# Patient Record
(demographics unavailable — no encounter records)

---

## 2024-10-30 NOTE — DISCUSSION/SUMMARY
[de-identified] : Today I had a lengthy discussion with the patient regarding their left great toe pain. I have addressed all the patient's concerns surrounding the pathology of their condition. I have reviewed the patient's MRI results with them in great detail. Nondisplaced obliquely oriented fracture of the distal first phalangeal head with intra-articular extension. Small capsular avulsion of the medial plantar base of the distal first phalanx. I would like the patient to proceed with conservative management, which was discussed in great detail.     Plan:  1.  In order to provide the patient with a better understanding of their ailment, I educated them about the anatomy, physiology and lifespan of their condition using a foot model. 2. I recommend that the patient utilize ice, NSAIDS/Tylenol PRN, and heat. They can also elevate their RLE above the level of the heart. 3. I recommend that the patient utilize a Marie extension plate when she plays sports. The patient was provided with the Marie extension plate in the office today.  Patient is cleared for all physical activities   f/u if needed.   The patient understood and verbally agreed to the treatment plan. All of their questions were answered, and they were satisfied with the visit. The patient should call the office if they have any questions or experience worsening symptoms.

## 2024-10-30 NOTE — HISTORY OF PRESENT ILLNESS
[FreeTextEntry1] :  10/30/2024: HEIKE BOGGS is a 14 year old female presenting to the office for a follow up evaluation of fracture of the distal first phalangeal head. She reports that her symptoms have improved since her last visit to the office. She is scared to start training again.  The patient presents to the office in snkers and ambulating without assistance.  /04/2024: HEIKE BOGGS is a 14 year old female presenting to the office with her mother for a follow up evaluation and MRI review of her left foot, fracture of the distal first phalangeal head. She reports no change in her symptoms since last visit. However, she denies worsening symptoms. She endorses tenderness to palpation. She denies pain when walking or jogging, but she has yet to attempt sprinting. She has been avoiding sports since last visit. She denies any new trauma or injury. The patient presents to the office in [facility] snkers and ambulating without assistance.  09/25/2024: The patient is a 14-year-old female who presents with great toe pain. The patient is accompanied by his mother. The patient states that she has been experiencing symptoms since 8/12/2024. She was initially told that she sustained a great toe fracture, which was treated conservatively and placed in a hard sole medical shoe. She returned to sports 2-3 weeks later. She recently returned back to school related soccer where she started playing for longer periods of time. Her  was taping her, but she continued to feel pain. She has not been playing soccer for the past week. She presents today wearing sneakers and is ambulating without assistance. No other complaints.

## 2024-10-30 NOTE — PHYSICAL EXAM
[de-identified] : Left Foot Physical Examination:  General: Alert and oriented x3.  In no acute distress.  Pleasant in nature with a normal affect.  No apparent respiratory distress.  Erythema, Warmth, Rubor: Negative Swelling: Negative  Left Big Toe Exam ROM Great toe: 40 degrees of dorsiflexion, 60 degrees of plantarflexion.  ROM Ankle: 1. Dorsiflexion: 10 degrees 2. Plantarflexion: 40 degrees 3. Inversion: 30 degrees 4. Eversion: 20 degrees  ROM of digits: Normal  Pes Planus: Negative Pes Cavus: Negative  Bunion: Negative Tailor's Bunion (Bunionette): Negative Hammer Toe Deformity/Deformities: Negative  Tenderness to Palpation:  1. Heel Pain: Negative 2. Midfoot Pain: Negative 3. First MTP Joint: Negative 4. Lis Franc Joint: Negative 5. Great toe pain at the 1st IP joint  Tenderness Metatarsals: 1st MT: Negative 2nd MT: Negative 3rd MT: Negative 4th MT: Negative 5th MT: Negative Base of the 5th MT: Negative  Ligament Pain: 1. Lis Franc Ligament: Negative 2. Plantar Fascia Ligament: Negative  Strength:  5/5 TA/GS/EHL/FHL/EDL/ADD/ABD  Pulses: 2+ DP/PT Pulses  Capillary Refill Toes: <2 seconds  Neuro: Intact motor and sensory throughout  Additional Test: 1. Marie's Squeeze Test: Negative 2. Calcaneal Squeeze Test: Negative [de-identified] : EXAM: [id number] - MR FOOT LT  - ORDERED BY:  EULALIA HOLMAN   PROCEDURE DATE:  09/26/2024    INTERPRETATION:  CLINICAL INDICATION: Foot pain. Evaluate great toe interphalangeal joint chronic fracture.  MR of the left foot without contrast  COMPARISON: Foot radiograph 9/25/2024 were reviewed.  FINDINGS:  OSSEOUS STRUCTURES: Sequela of oblique fracture of the distal first phalanx extending from the lateral neck to the distal medial cortex with intra-articular extension. Additionally, there is a focal cortical avulsion fracture of the plantar/medial aspect of the base of the distal first phalanx favored to be at the capsular attachment. Patchy marrow edema is present within the head of the proximal first phalanx as well as within the base of the distal phalanx, with slightly more increased marrow signal than expected at the physis, too may represent sequela of a mild type I Salter-Navarro injury.  JOINTS: The articular cartilage is preserved.  TENDONS AND MUSCLES: The tendons and muscles are intact.  LISFRANC AND MIDFOOT LIGAMENTS: The Lisfranc and midfoot ligaments are intact.  PLANTAR PLATES AND FOREFOOT CAPSULAR STRUCTURES: The plantar plates are intact.  WEBSPACES: There is no Marie?s neuroma. Mild first and third webspace bursitis..  SUBCUTANEOUS TISSUES:   Normal.  IMPRESSION: Nondisplaced obliquely oriented fracture of the distal first phalangeal head with intra-articular extension. Small capsular avulsion of the medial plantar base of the distal first phalanx. Slight more hyperintense marrow signal that expected at the physis of the distal first phalanx, which may reflect sequela of a type I Salter-Navarro injury.  --- End of Report ---       RANJANA ANN MD; Attending Radiologist This document has been electronically signed. Sep 27 2024  8:18AM

## 2024-10-30 NOTE — ADDENDUM
[FreeTextEntry1] : I, Agustín Peoples, acted solely as a scribe for Dr. Agustín Caban on this date 10/30/2024  .   All medical record entries made by the Scribe were at my, Dr. Agustín Caban, direction and personally dictated by me on 10/30/2024 . I have reviewed the chart and agree that the record accurately reflects my personal performance of the history, physical exam, assessment and plan. I have also personally directed, reviewed, and agreed with the chart. Yes, record another set of vital signs

## 2025-02-10 NOTE — HISTORY OF PRESENT ILLNESS
[de-identified] : The patient is a 14 year old RT hand dominant female who presents today for lt clavicle pain. Date of Injury/Onset: 02/01/25 Pain: At Rest: 3/10 With Activity: 6/10 Mechanism of injury: pt states she was playing soccer and fell on her shoulder. pt states she heard a crack.  This is not a Work Related Injury being treated under Worker's Compensation. This is an athletic injury occurring associated with an interscholastic or organized sports team. Quality of symptoms: sharp pain  Improves with: tylenol and motrin  Worse with: Moving it or touching it.  Treatment/Imaging/Studies Since Last Visit: _ 	Reports Available For Review Today: _ Out of work/sport: soccer, since 02/01/25 School/Sport/Position/Occupation: student Additional Information:

## 2025-02-10 NOTE — IMAGING
[de-identified] : The patient is a well appearing 14 year  old female of their stated age. Neck is supple & nontender to palpation. Negative Spurling's test. Patient presents in simple sling.    Effected Shoulder: LEFT Inspection: Scapula Winging: Negative Deformity: APEX SUPERIOR MIDSHAFT CLAVICLE  Erythema: None Ecchymosis: None Abrasions: None Effusion: None   Range of Motion: LIMITED BY PAIN  Active Forward Flexion: 90 degrees Active Abduction: 90 degrees Passive Forward Flexion: 90 degrees Passive Abduction: - degrees ER @ 90 degrees: - degrees IR @ 90 degrees: - degrees ER @ 0 degrees: - degrees Motor Exam: LIMITED BY PAIN  Forward Flexion: 3 out of 5 Flexion Plane of Scapula: 3 out of 5 Abduction: 3 out of 5 Internal Rotation: 5 out of 5 External Rotation: 5 out of 5 Distal Motor Strength: 5 out of 5   Stability Testing: Anterior: 1+ Posterior: 1+ Sulcus N: 1+ Sulcus ER: 1+ Provocative Tests: Drop Arm: Negative Impingement: Negative [place]: Negative X-Arm Adduction: Negative Belly Press: Negative Bear Hug: Negative Lift Off: Negative Apprehension: Negative Relocation: Negative Posterior Load & Shift: Negative   Palpation: AC Joint: Nontender Clavicle: TENDER, MIDSHAFT  SC Joint: Nontender Bicipital Groove: Nontender Coracoid Process: Nontender Pectoralis Minor Tendon: Nontender Pectoralis Major Tendon: Nontender & palpably intact Latissimus Dorsi: Nontender Proximal Humerus: Nontender Scapula Body: Nontender Medial Scapula Boarder: Nontender Scapula Spine: Nontender   Neurologic Exam: Sensation to Light Touch: Axillary: Grossly intact Ulnar: Grossly intact Radial: Grossly intact Median: Grossly intact Other:  N/A Circulatory/Pulses: Ulnar: 2+ Radial: 2+ Other Pertinent Findings: None   Contralateral Shoulder Range of Motion: Active Forward Flexion: 180 degrees Active Abduction: 180 degrees Passive Forward Flexion: 180 degrees Passive Abduction: 180 degrees ER @ 90 degrees: 90 degrees IR @ 90 degrees: 45 degrees ER @ 0 degrees: 50 degrees Motor Exam: Forward Flexion: 5 out of 5 Flexion Plane of Scapula: 5 out of 5 Abduction: 5 out of 5 Internal Rotation: 5 out of 5 External Rotation: 5 out of 5 Distal Motor Strength: 5 out of 5 Stability Testing: Anterior: 1+ Posterior: 1+ Sulcus N: 1+ Sulcus ER: 1+ Other Pertinent Findings: None   Assessment: The patient is a 14 year old female with left shoulder pain and radiographic and physical exam findings consistent with midshaft clavicle fracture.  The patients condition is acute Documents/Results Reviewed Today: X-Ray left clavicle  Tests/Studies Independently Interpreted Today: X-Ray left clavicle reveals evidence of midshaft clavicle fracture with apex superior deformity, no change in approximation.  Pertinent findings include: Presents in simple sling, 90/90, intact rotator cuff strength, tender midshaft clavicle with superior apex deformity. Confounding medical conditions/concerns: None   Plan: We discussed treatment options for the patients midshaft clavicle fracture given no evidence of shortening nor displacement from previous images. She will continue use of simple sling. Remain out of gym and sports. Recommended icing. Discussed taking OTC anti-inflammatories as needed - use as directed. Addressed risks of any recurrent injury and displacement of which may be indicative of surgery. Modify activity as discussed.   Tests Ordered: None  Prescription Medications Ordered: Discussed use of OTC NSAIDs including but not limited to Aleve, Motrin, Tylenol Advil, etc. Braces/DME Ordered: Simple sling  Activity/Work/Sports Status: Out of gym and sports  Additional Instructions: None Follow-Up: 3 weeks   The patient's current medication management of their orthopedic diagnosis was reviewed today: The patient declined and/or was contraindicated for the recommended prescription medication Naprosyn and will use over the counter Advil, Aleve, Voltaren Gel or Tylenol as directed. (1) We discussed a comprehensive treatment plan that included possible pharmaceutical management involving the use of prescription strength medications including but not limited to options such as oral Naprosyn 500mg BID, once daily Meloxicam 15 mg, or 500-650 mg Tylenol versus over the counter oral medications and topical prescription NSAID Pennsaid vs over the counter Voltaren gel.  Based on our extensive discussion, the patient declined prescription medication and will use over the counter Advil, Alleve, Voltaren Gel or Tylenol as directed. (2) There is a moderate risk of morbidity with further treatment, especially from use of prescription strength medications and possible side effects of these medications which include upset stomach with oral medications, skin reactions to topical medications and cardiac/renal issues with long term use. (3) I recommended that the patient follow-up with their medical physician to discuss any significant specific potential issues with long term medication use such as interactions with current medications or with exacerbation of underlying medical comorbidities. (4) The benefits and risks associated with use of injectable, oral or topical, prescription and over the counter anti-inflammatory medications were discussed with the patient. The patient voiced understanding of the risks including but not limited to bleeding, stroke, kidney dysfunction, heart disease, and were referred to the black box warning label for further information.   IAnitra attest that this documentation has been prepared under the direction and in the presence of Provider Dr. Eric Shea.      [Left] : left shoulder [FreeTextEntry3] : X-Ray left clavicle reveals evidence of midshaft clavicle fracture with apex superior deformity, no change in approximation.  bed rails

## 2025-02-10 NOTE — HISTORY OF PRESENT ILLNESS
[de-identified] : The patient is a 14 year old RT hand dominant female who presents today for lt clavicle pain. Date of Injury/Onset: 02/01/25 Pain: At Rest: 3/10 With Activity: 6/10 Mechanism of injury: pt states she was playing soccer and fell on her shoulder. pt states she heard a crack.  This is not a Work Related Injury being treated under Worker's Compensation. This is an athletic injury occurring associated with an interscholastic or organized sports team. Quality of symptoms: sharp pain  Improves with: tylenol and motrin  Worse with: Moving it or touching it.  Treatment/Imaging/Studies Since Last Visit: _ 	Reports Available For Review Today: _ Out of work/sport: soccer, since 02/01/25 School/Sport/Position/Occupation: student Additional Information:

## 2025-02-10 NOTE — IMAGING
[de-identified] : The patient is a well appearing 14 year  old female of their stated age. Neck is supple & nontender to palpation. Negative Spurling's test. Patient presents in simple sling.    Effected Shoulder: LEFT Inspection: Scapula Winging: Negative Deformity: APEX SUPERIOR MIDSHAFT CLAVICLE  Erythema: None Ecchymosis: None Abrasions: None Effusion: None   Range of Motion: LIMITED BY PAIN  Active Forward Flexion: 90 degrees Active Abduction: 90 degrees Passive Forward Flexion: 90 degrees Passive Abduction: - degrees ER @ 90 degrees: - degrees IR @ 90 degrees: - degrees ER @ 0 degrees: - degrees Motor Exam: LIMITED BY PAIN  Forward Flexion: 3 out of 5 Flexion Plane of Scapula: 3 out of 5 Abduction: 3 out of 5 Internal Rotation: 5 out of 5 External Rotation: 5 out of 5 Distal Motor Strength: 5 out of 5   Stability Testing: Anterior: 1+ Posterior: 1+ Sulcus N: 1+ Sulcus ER: 1+ Provocative Tests: Drop Arm: Negative Impingement: Negative [place]: Negative X-Arm Adduction: Negative Belly Press: Negative Bear Hug: Negative Lift Off: Negative Apprehension: Negative Relocation: Negative Posterior Load & Shift: Negative   Palpation: AC Joint: Nontender Clavicle: TENDER, MIDSHAFT  SC Joint: Nontender Bicipital Groove: Nontender Coracoid Process: Nontender Pectoralis Minor Tendon: Nontender Pectoralis Major Tendon: Nontender & palpably intact Latissimus Dorsi: Nontender Proximal Humerus: Nontender Scapula Body: Nontender Medial Scapula Boarder: Nontender Scapula Spine: Nontender   Neurologic Exam: Sensation to Light Touch: Axillary: Grossly intact Ulnar: Grossly intact Radial: Grossly intact Median: Grossly intact Other:  N/A Circulatory/Pulses: Ulnar: 2+ Radial: 2+ Other Pertinent Findings: None   Contralateral Shoulder Range of Motion: Active Forward Flexion: 180 degrees Active Abduction: 180 degrees Passive Forward Flexion: 180 degrees Passive Abduction: 180 degrees ER @ 90 degrees: 90 degrees IR @ 90 degrees: 45 degrees ER @ 0 degrees: 50 degrees Motor Exam: Forward Flexion: 5 out of 5 Flexion Plane of Scapula: 5 out of 5 Abduction: 5 out of 5 Internal Rotation: 5 out of 5 External Rotation: 5 out of 5 Distal Motor Strength: 5 out of 5 Stability Testing: Anterior: 1+ Posterior: 1+ Sulcus N: 1+ Sulcus ER: 1+ Other Pertinent Findings: None   Assessment: The patient is a 14 year old female with left shoulder pain and radiographic and physical exam findings consistent with midshaft clavicle fracture.  The patients condition is acute Documents/Results Reviewed Today: X-Ray left clavicle  Tests/Studies Independently Interpreted Today: X-Ray left clavicle reveals evidence of midshaft clavicle fracture with apex superior deformity, no change in approximation.  Pertinent findings include: Presents in simple sling, 90/90, intact rotator cuff strength, tender midshaft clavicle with superior apex deformity. Confounding medical conditions/concerns: None   Plan: We discussed treatment options for the patients midshaft clavicle fracture given no evidence of shortening nor displacement from previous images. She will continue use of simple sling. Remain out of gym and sports. Recommended icing. Discussed taking OTC anti-inflammatories as needed - use as directed. Addressed risks of any recurrent injury and displacement of which may be indicative of surgery. Modify activity as discussed.   Tests Ordered: None  Prescription Medications Ordered: Discussed use of OTC NSAIDs including but not limited to Aleve, Motrin, Tylenol Advil, etc. Braces/DME Ordered: Simple sling  Activity/Work/Sports Status: Out of gym and sports  Additional Instructions: None Follow-Up: 3 weeks   The patient's current medication management of their orthopedic diagnosis was reviewed today: The patient declined and/or was contraindicated for the recommended prescription medication Naprosyn and will use over the counter Advil, Aleve, Voltaren Gel or Tylenol as directed. (1) We discussed a comprehensive treatment plan that included possible pharmaceutical management involving the use of prescription strength medications including but not limited to options such as oral Naprosyn 500mg BID, once daily Meloxicam 15 mg, or 500-650 mg Tylenol versus over the counter oral medications and topical prescription NSAID Pennsaid vs over the counter Voltaren gel.  Based on our extensive discussion, the patient declined prescription medication and will use over the counter Advil, Alleve, Voltaren Gel or Tylenol as directed. (2) There is a moderate risk of morbidity with further treatment, especially from use of prescription strength medications and possible side effects of these medications which include upset stomach with oral medications, skin reactions to topical medications and cardiac/renal issues with long term use. (3) I recommended that the patient follow-up with their medical physician to discuss any significant specific potential issues with long term medication use such as interactions with current medications or with exacerbation of underlying medical comorbidities. (4) The benefits and risks associated with use of injectable, oral or topical, prescription and over the counter anti-inflammatory medications were discussed with the patient. The patient voiced understanding of the risks including but not limited to bleeding, stroke, kidney dysfunction, heart disease, and were referred to the black box warning label for further information.   IAnitra attest that this documentation has been prepared under the direction and in the presence of Provider Dr. Eric Shea.      [Left] : left shoulder [FreeTextEntry3] : X-Ray left clavicle reveals evidence of midshaft clavicle fracture with apex superior deformity, no change in approximation.

## 2025-02-25 NOTE — HISTORY OF PRESENT ILLNESS
[de-identified] : The patient is a 14 year old RT hand dominant female who presents today for lt clavicle pain. Date of Injury/Onset: 02/01/25 Pain: At Rest: 0/10 With Activity: 0/10 Mechanism of injury: pt states she was playing soccer and fell on her shoulder. pt states she heard a crack.  This is not a Work Related Injury being treated under Worker's Compensation. This is an athletic injury occurring associated with an interscholastic or organized sports team. Quality of symptoms: sharp pain  Improves with: tylenol and motrin  Worse with: [place] Motions Treatment/Imaging/Studies Since Last Visit: sling 	Reports Available For Review Today: none Out of work/sport: soccer, since 02/01/25 School/Sport/Position/Occupation: student Changes since last visit: patient reports improvement in pain since she has been wearing the sling. experiences pain if she bumps her arm and her shoulder is jolted. is having some elbow stiffness from being in the sling Additional Information:

## 2025-02-25 NOTE — HISTORY OF PRESENT ILLNESS
[de-identified] : The patient is a 14 year old RT hand dominant female who presents today for lt clavicle pain. Date of Injury/Onset: 02/01/25 Pain: At Rest: 0/10 With Activity: 0/10 Mechanism of injury: pt states she was playing soccer and fell on her shoulder. pt states she heard a crack.  This is not a Work Related Injury being treated under Worker's Compensation. This is an athletic injury occurring associated with an interscholastic or organized sports team. Quality of symptoms: sharp pain  Improves with: tylenol and motrin  Worse with: [place] Motions Treatment/Imaging/Studies Since Last Visit: sling 	Reports Available For Review Today: none Out of work/sport: soccer, since 02/01/25 School/Sport/Position/Occupation: student Changes since last visit: patient reports improvement in pain since she has been wearing the sling. experiences pain if she bumps her arm and her shoulder is jolted. is having some elbow stiffness from being in the sling Additional Information:

## 2025-02-25 NOTE — IMAGING
[Left] : left shoulder [de-identified] : The patient is a well appearing 14 year  old female of their stated age. Neck is supple & nontender to palpation. Negative Spurling's test. Patient presents in simple sling.    Effected Shoulder: LEFT Inspection: Scapula Winging: Negative Deformity: APEX SUPERIOR MIDSHAFT CLAVICLE  Erythema: None Ecchymosis: None Abrasions: None Effusion: None   Range of Motion: LIMITED BY PAIN  Active Forward Flexion: 90 degrees Active Abduction: 90 degrees Passive Forward Flexion: 90 degrees Passive Abduction: - degrees ER @ 90 degrees: - degrees IR @ 90 degrees: - degrees ER @ 0 degrees: - degrees Motor Exam: LIMITED BY PAIN  Forward Flexion: 3 out of 5 Flexion Plane of Scapula: 3 out of 5 Abduction: 3 out of 5 Internal Rotation: 5 out of 5 External Rotation: 5 out of 5 Distal Motor Strength: 5 out of 5   Stability Testing: Anterior: 1+ Posterior: 1+ Sulcus N: 1+ Sulcus ER: 1+ Provocative Tests: Drop Arm: Negative Impingement: Negative Arboles: Negative X-Arm Adduction: Negative Belly Press: Negative Bear Hug: Negative Lift Off: Negative Apprehension: Negative Relocation: Negative Posterior Load & Shift: Negative   Palpation: AC Joint: Nontender Clavicle: TENDER, MIDSHAFT  SC Joint: Nontender Bicipital Groove: Nontender Coracoid Process: Nontender Pectoralis Minor Tendon: Nontender Pectoralis Major Tendon: Nontender & palpably intact Latissimus Dorsi: Nontender Proximal Humerus: Nontender Scapula Body: Nontender Medial Scapula Boarder: Nontender Scapula Spine: Nontender   Neurologic Exam: Sensation to Light Touch: Axillary: Grossly intact Ulnar: Grossly intact Radial: Grossly intact Median: Grossly intact Other:  N/A Circulatory/Pulses: Ulnar: 2+ Radial: 2+ Other Pertinent Findings: None   Contralateral Shoulder Range of Motion: Active Forward Flexion: 180 degrees Active Abduction: 180 degrees Passive Forward Flexion: 180 degrees Passive Abduction: 180 degrees ER @ 90 degrees: 90 degrees IR @ 90 degrees: 45 degrees ER @ 0 degrees: 50 degrees Motor Exam: Forward Flexion: 5 out of 5 Flexion Plane of Scapula: 5 out of 5 Abduction: 5 out of 5 Internal Rotation: 5 out of 5 External Rotation: 5 out of 5 Distal Motor Strength: 5 out of 5 Stability Testing: Anterior: 1+ Posterior: 1+ Sulcus N: 1+ Sulcus ER: 1+ Other Pertinent Findings: None   Assessment: The patient is a 14 year old female with left shoulder pain and radiographic and physical exam findings consistent with midshaft clavicle fracture.  The patients condition is acute Documents/Results Reviewed Today: X-Ray left clavicle  Tests/Studies Independently Interpreted Today: X-Ray left clavicle reveals evidence of midshaft clavicle fracture with apex superior deformity, early inferior callous formation, no change in approximation.  Pertinent findings include: Presents in simple sling, 90/90, intact rotator cuff strength, tender midshaft clavicle with superior apex deformity. Confounding medical conditions/concerns: None   Plan: The patient is now 3 weeks s/p DOI. Upon review of radiographs, she is starting to remodel and heal her fracture. We discussed treatment options for the patients midshaft clavicle fracture given no evidence of shortening nor displacement from previous images. She will continue use of simple sling at school, gradually getting out of it at home. Remain out of gym and sports. Recommended icing. Discussed taking OTC anti-inflammatories as needed - use as directed. Addressed risks of any recurrent injury and displacement of which may be indicative of surgery. Modify activity as discussed.   Tests Ordered: None  Prescription Medications Ordered: Discussed use of OTC NSAIDs including but not limited to Aleve, Motrin, Tylenol Advil, etc. Braces/DME Ordered: Simple sling  Activity/Work/Sports Status: Out of gym and sports  Additional Instructions: None Follow-Up: 3 weeks, new X-Rays  The patient's current medication management of their orthopedic diagnosis was reviewed today: The patient declined and/or was contraindicated for the recommended prescription medication Naprosyn and will use over the counter Advil, Aleve, Voltaren Gel or Tylenol as directed. (1) We discussed a comprehensive treatment plan that included possible pharmaceutical management involving the use of prescription strength medications including but not limited to options such as oral Naprosyn 500mg BID, once daily Meloxicam 15 mg, or 500-650 mg Tylenol versus over the counter oral medications and topical prescription NSAID Pennsaid vs over the counter Voltaren gel.  Based on our extensive discussion, the patient declined prescription medication and will use over the counter Advil, Alleve, Voltaren Gel or Tylenol as directed. (2) There is a moderate risk of morbidity with further treatment, especially from use of prescription strength medications and possible side effects of these medications which include upset stomach with oral medications, skin reactions to topical medications and cardiac/renal issues with long term use. (3) I recommended that the patient follow-up with their medical physician to discuss any significant specific potential issues with long term medication use such as interactions with current medications or with exacerbation of underlying medical comorbidities. (4) The benefits and risks associated with use of injectable, oral or topical, prescription and over the counter anti-inflammatory medications were discussed with the patient. The patient voiced understanding of the risks including but not limited to bleeding, stroke, kidney dysfunction, heart disease, and were referred to the black box warning label for further information.   IAnitra attest that this documentation has been prepared under the direction and in the presence of Provider Dr. Eric Shea.   The documentation recorded by the scribe accurately reflects the service Gini DE LA ROSA PA-C, personally performed and the decisions made by me. The patient was seen by me under the direct supervision of Dr. Eric Shea. [FreeTextEntry3] : X-Ray left clavicle reveals evidence of midshaft clavicle fracture with apex superior deformity, early inferior callous formation, no change in approximation.

## 2025-02-25 NOTE — IMAGING
[Left] : left shoulder [de-identified] : The patient is a well appearing 14 year  old female of their stated age. Neck is supple & nontender to palpation. Negative Spurling's test. Patient presents in simple sling.    Effected Shoulder: LEFT Inspection: Scapula Winging: Negative Deformity: APEX SUPERIOR MIDSHAFT CLAVICLE  Erythema: None Ecchymosis: None Abrasions: None Effusion: None   Range of Motion: LIMITED BY PAIN  Active Forward Flexion: 90 degrees Active Abduction: 90 degrees Passive Forward Flexion: 90 degrees Passive Abduction: - degrees ER @ 90 degrees: - degrees IR @ 90 degrees: - degrees ER @ 0 degrees: - degrees Motor Exam: LIMITED BY PAIN  Forward Flexion: 3 out of 5 Flexion Plane of Scapula: 3 out of 5 Abduction: 3 out of 5 Internal Rotation: 5 out of 5 External Rotation: 5 out of 5 Distal Motor Strength: 5 out of 5   Stability Testing: Anterior: 1+ Posterior: 1+ Sulcus N: 1+ Sulcus ER: 1+ Provocative Tests: Drop Arm: Negative Impingement: Negative Plaza: Negative X-Arm Adduction: Negative Belly Press: Negative Bear Hug: Negative Lift Off: Negative Apprehension: Negative Relocation: Negative Posterior Load & Shift: Negative   Palpation: AC Joint: Nontender Clavicle: TENDER, MIDSHAFT  SC Joint: Nontender Bicipital Groove: Nontender Coracoid Process: Nontender Pectoralis Minor Tendon: Nontender Pectoralis Major Tendon: Nontender & palpably intact Latissimus Dorsi: Nontender Proximal Humerus: Nontender Scapula Body: Nontender Medial Scapula Boarder: Nontender Scapula Spine: Nontender   Neurologic Exam: Sensation to Light Touch: Axillary: Grossly intact Ulnar: Grossly intact Radial: Grossly intact Median: Grossly intact Other:  N/A Circulatory/Pulses: Ulnar: 2+ Radial: 2+ Other Pertinent Findings: None   Contralateral Shoulder Range of Motion: Active Forward Flexion: 180 degrees Active Abduction: 180 degrees Passive Forward Flexion: 180 degrees Passive Abduction: 180 degrees ER @ 90 degrees: 90 degrees IR @ 90 degrees: 45 degrees ER @ 0 degrees: 50 degrees Motor Exam: Forward Flexion: 5 out of 5 Flexion Plane of Scapula: 5 out of 5 Abduction: 5 out of 5 Internal Rotation: 5 out of 5 External Rotation: 5 out of 5 Distal Motor Strength: 5 out of 5 Stability Testing: Anterior: 1+ Posterior: 1+ Sulcus N: 1+ Sulcus ER: 1+ Other Pertinent Findings: None   Assessment: The patient is a 14 year old female with left shoulder pain and radiographic and physical exam findings consistent with midshaft clavicle fracture.  The patients condition is acute Documents/Results Reviewed Today: X-Ray left clavicle  Tests/Studies Independently Interpreted Today: X-Ray left clavicle reveals evidence of midshaft clavicle fracture with apex superior deformity, early inferior callous formation, no change in approximation.  Pertinent findings include: Presents in simple sling, 90/90, intact rotator cuff strength, tender midshaft clavicle with superior apex deformity. Confounding medical conditions/concerns: None   Plan: The patient is now 3 weeks s/p DOI. Upon review of radiographs, she is starting to remodel and heal her fracture. We discussed treatment options for the patients midshaft clavicle fracture given no evidence of shortening nor displacement from previous images. She will continue use of simple sling at school, gradually getting out of it at home. Remain out of gym and sports. Recommended icing. Discussed taking OTC anti-inflammatories as needed - use as directed. Addressed risks of any recurrent injury and displacement of which may be indicative of surgery. Modify activity as discussed.   Tests Ordered: None  Prescription Medications Ordered: Discussed use of OTC NSAIDs including but not limited to Aleve, Motrin, Tylenol Advil, etc. Braces/DME Ordered: Simple sling  Activity/Work/Sports Status: Out of gym and sports  Additional Instructions: None Follow-Up: 3 weeks, new X-Rays  The patient's current medication management of their orthopedic diagnosis was reviewed today: The patient declined and/or was contraindicated for the recommended prescription medication Naprosyn and will use over the counter Advil, Aleve, Voltaren Gel or Tylenol as directed. (1) We discussed a comprehensive treatment plan that included possible pharmaceutical management involving the use of prescription strength medications including but not limited to options such as oral Naprosyn 500mg BID, once daily Meloxicam 15 mg, or 500-650 mg Tylenol versus over the counter oral medications and topical prescription NSAID Pennsaid vs over the counter Voltaren gel.  Based on our extensive discussion, the patient declined prescription medication and will use over the counter Advil, Alleve, Voltaren Gel or Tylenol as directed. (2) There is a moderate risk of morbidity with further treatment, especially from use of prescription strength medications and possible side effects of these medications which include upset stomach with oral medications, skin reactions to topical medications and cardiac/renal issues with long term use. (3) I recommended that the patient follow-up with their medical physician to discuss any significant specific potential issues with long term medication use such as interactions with current medications or with exacerbation of underlying medical comorbidities. (4) The benefits and risks associated with use of injectable, oral or topical, prescription and over the counter anti-inflammatory medications were discussed with the patient. The patient voiced understanding of the risks including but not limited to bleeding, stroke, kidney dysfunction, heart disease, and were referred to the black box warning label for further information.   IAnitra attest that this documentation has been prepared under the direction and in the presence of Provider Dr. Eric Shea.   The documentation recorded by the scribe accurately reflects the service Gini DE LA ROSA PA-C, personally performed and the decisions made by me. The patient was seen by me under the direct supervision of Dr. Eric Shea. [FreeTextEntry3] : X-Ray left clavicle reveals evidence of midshaft clavicle fracture with apex superior deformity, early inferior callous formation, no change in approximation.

## 2025-03-18 NOTE — IMAGING
[Left] : left shoulder [de-identified] : The patient is a well appearing 14 year  old female of their stated age. Neck is supple & nontender to palpation. Negative Spurling's test. Patient presents in simple sling.    Effected Shoulder: LEFT Inspection: Scapula Winging: Negative Deformity: APEX SUPERIOR MIDSHAFT CLAVICLE  Erythema: None Ecchymosis: None Abrasions: None Effusion: None   Range of Motion:  Active Forward Flexion: 180 degrees Active Abduction: 180 degrees Passive Forward Flexion: 180 degrees Passive Abduction: 180 degrees ER @ 90 degrees: 90 degrees IR @ 90 degrees: 45 degrees ER @ 0 degrees: 50 degrees Motor Exam:   Forward Flexion: 5 out of 5 Flexion Plane of Scapula: 5 out of 5 Abduction: 5 out of 5 Internal Rotation: 5 out of 5 External Rotation: 5 out of 5 Distal Motor Strength: 5 out of 5   Stability Testing: Anterior: 1+ Posterior: 1+ Sulcus N: 1+ Sulcus ER: 1+ Provocative Tests: Drop Arm: Negative Impingement: Negative [place]: Negative X-Arm Adduction: Negative Belly Press: Negative Bear Hug: Negative Lift Off: Negative Apprehension: Negative Relocation: Negative Posterior Load & Shift: Negative   Palpation: AC Joint: Nontender Clavicle: Nontender   SC Joint: Nontender Bicipital Groove: Nontender Coracoid Process: Nontender Pectoralis Minor Tendon: Nontender Pectoralis Major Tendon: Nontender & palpably intact Latissimus Dorsi: Nontender Proximal Humerus: Nontender Scapula Body: Nontender Medial Scapula Boarder: Nontender Scapula Spine: Nontender   Neurologic Exam: Sensation to Light Touch: Axillary: Grossly intact Ulnar: Grossly intact Radial: Grossly intact Median: Grossly intact Other:  N/A Circulatory/Pulses: Ulnar: 2+ Radial: 2+ Other Pertinent Findings: None   Contralateral Shoulder Range of Motion: Active Forward Flexion: 180 degrees Active Abduction: 180 degrees Passive Forward Flexion: 180 degrees Passive Abduction: 180 degrees ER @ 90 degrees: 90 degrees IR @ 90 degrees: 45 degrees ER @ 0 degrees: 50 degrees Motor Exam: Forward Flexion: 5 out of 5 Flexion Plane of Scapula: 5 out of 5 Abduction: 5 out of 5 Internal Rotation: 5 out of 5 External Rotation: 5 out of 5 Distal Motor Strength: 5 out of 5 Stability Testing: Anterior: 1+ Posterior: 1+ Sulcus N: 1+ Sulcus ER: 1+ Other Pertinent Findings: None   Assessment: The patient is a 14 year old female with left shoulder pain and radiographic and physical exam findings consistent with healed midshaft clavicle fracture.  The patients condition is acute Documents/Results Reviewed Today: X-Ray left clavicle  Tests/Studies Independently Interpreted Today: X-Ray left clavicle reveals evidence of bony remodeling and advanced callous formation about midshaft clavicle fracture.  Pertinent findings include: Presents in simple sling, 90/90, intact rotator cuff strength, tender midshaft clavicle with superior apex deformity. Confounding medical conditions/concerns: None   Plan: The patient reports gradual, interval improvement in discomfort related to her clavicle fracture - healed upon review of radiographs. As of next week, the patient is cleared for all gym and sport specific activity. Discussed the importance of increasing activity gradually and avoiding fatigue. She may obtain a clavicle strap. Discussed taking OTC anti-inflammatories as needed - use as directed. The patient will follow up on a PRN basis unless new symptoms arise.  Tests Ordered: None  Prescription Medications Ordered: Discussed use of OTC NSAIDs including but not limited to Aleve, Motrin, Tylenol Advil, etc. Braces/DME Ordered: None   Activity/Work/Sports Status: Cleared for gym and sports as of 3/24/25  Additional Instructions: None Follow-Up: PRN  The patient's current medication management of their orthopedic diagnosis was reviewed today: The patient declined and/or was contraindicated for the recommended prescription medication Naprosyn and will use over the counter Advil, Aleve, Voltaren Gel or Tylenol as directed. (1) We discussed a comprehensive treatment plan that included possible pharmaceutical management involving the use of prescription strength medications including but not limited to options such as oral Naprosyn 500mg BID, once daily Meloxicam 15 mg, or 500-650 mg Tylenol versus over the counter oral medications and topical prescription NSAID Pennsaid vs over the counter Voltaren gel.  Based on our extensive discussion, the patient declined prescription medication and will use over the counter Advil, Alleve, Voltaren Gel or Tylenol as directed. (2) There is a moderate risk of morbidity with further treatment, especially from use of prescription strength medications and possible side effects of these medications which include upset stomach with oral medications, skin reactions to topical medications and cardiac/renal issues with long term use. (3) I recommended that the patient follow-up with their medical physician to discuss any significant specific potential issues with long term medication use such as interactions with current medications or with exacerbation of underlying medical comorbidities. (4) The benefits and risks associated with use of injectable, oral or topical, prescription and over the counter anti-inflammatory medications were discussed with the patient. The patient voiced understanding of the risks including but not limited to bleeding, stroke, kidney dysfunction, heart disease, and were referred to the black box warning label for further information.   IAnitra attest that this documentation has been prepared under the direction and in the presence of Provider Dr. Eric Shea.   The documentation recorded by the scribe accurately reflects the services Dr. Eric DE LA ROSA, personally performed and the decisions made by me. [FreeTextEntry3] : X-Ray left clavicle reveals evidence of bony remodeling and advanced callous formation about midshaft clavicle fracture.

## 2025-03-18 NOTE — IMAGING
[Left] : left shoulder [de-identified] : The patient is a well appearing 14 year  old female of their stated age. Neck is supple & nontender to palpation. Negative Spurling's test. Patient presents in simple sling.    Effected Shoulder: LEFT Inspection: Scapula Winging: Negative Deformity: APEX SUPERIOR MIDSHAFT CLAVICLE  Erythema: None Ecchymosis: None Abrasions: None Effusion: None   Range of Motion:  Active Forward Flexion: 180 degrees Active Abduction: 180 degrees Passive Forward Flexion: 180 degrees Passive Abduction: 180 degrees ER @ 90 degrees: 90 degrees IR @ 90 degrees: 45 degrees ER @ 0 degrees: 50 degrees Motor Exam:   Forward Flexion: 5 out of 5 Flexion Plane of Scapula: 5 out of 5 Abduction: 5 out of 5 Internal Rotation: 5 out of 5 External Rotation: 5 out of 5 Distal Motor Strength: 5 out of 5   Stability Testing: Anterior: 1+ Posterior: 1+ Sulcus N: 1+ Sulcus ER: 1+ Provocative Tests: Drop Arm: Negative Impingement: Negative Burt: Negative X-Arm Adduction: Negative Belly Press: Negative Bear Hug: Negative Lift Off: Negative Apprehension: Negative Relocation: Negative Posterior Load & Shift: Negative   Palpation: AC Joint: Nontender Clavicle: Nontender   SC Joint: Nontender Bicipital Groove: Nontender Coracoid Process: Nontender Pectoralis Minor Tendon: Nontender Pectoralis Major Tendon: Nontender & palpably intact Latissimus Dorsi: Nontender Proximal Humerus: Nontender Scapula Body: Nontender Medial Scapula Boarder: Nontender Scapula Spine: Nontender   Neurologic Exam: Sensation to Light Touch: Axillary: Grossly intact Ulnar: Grossly intact Radial: Grossly intact Median: Grossly intact Other:  N/A Circulatory/Pulses: Ulnar: 2+ Radial: 2+ Other Pertinent Findings: None   Contralateral Shoulder Range of Motion: Active Forward Flexion: 180 degrees Active Abduction: 180 degrees Passive Forward Flexion: 180 degrees Passive Abduction: 180 degrees ER @ 90 degrees: 90 degrees IR @ 90 degrees: 45 degrees ER @ 0 degrees: 50 degrees Motor Exam: Forward Flexion: 5 out of 5 Flexion Plane of Scapula: 5 out of 5 Abduction: 5 out of 5 Internal Rotation: 5 out of 5 External Rotation: 5 out of 5 Distal Motor Strength: 5 out of 5 Stability Testing: Anterior: 1+ Posterior: 1+ Sulcus N: 1+ Sulcus ER: 1+ Other Pertinent Findings: None   Assessment: The patient is a 14 year old female with left shoulder pain and radiographic and physical exam findings consistent with healed midshaft clavicle fracture.  The patients condition is acute Documents/Results Reviewed Today: X-Ray left clavicle  Tests/Studies Independently Interpreted Today: X-Ray left clavicle reveals evidence of bony remodeling and advanced callous formation about midshaft clavicle fracture.  Pertinent findings include: Presents in simple sling, 90/90, intact rotator cuff strength, tender midshaft clavicle with superior apex deformity. Confounding medical conditions/concerns: None   Plan: The patient reports gradual, interval improvement in discomfort related to her clavicle fracture - healed upon review of radiographs. As of next week, the patient is cleared for all gym and sport specific activity. Discussed the importance of increasing activity gradually and avoiding fatigue. She may obtain a clavicle strap. Discussed taking OTC anti-inflammatories as needed - use as directed. The patient will follow up on a PRN basis unless new symptoms arise.  Tests Ordered: None  Prescription Medications Ordered: Discussed use of OTC NSAIDs including but not limited to Aleve, Motrin, Tylenol Advil, etc. Braces/DME Ordered: None   Activity/Work/Sports Status: Cleared for gym and sports as of 3/24/25  Additional Instructions: None Follow-Up: PRN  The patient's current medication management of their orthopedic diagnosis was reviewed today: The patient declined and/or was contraindicated for the recommended prescription medication Naprosyn and will use over the counter Advil, Aleve, Voltaren Gel or Tylenol as directed. (1) We discussed a comprehensive treatment plan that included possible pharmaceutical management involving the use of prescription strength medications including but not limited to options such as oral Naprosyn 500mg BID, once daily Meloxicam 15 mg, or 500-650 mg Tylenol versus over the counter oral medications and topical prescription NSAID Pennsaid vs over the counter Voltaren gel.  Based on our extensive discussion, the patient declined prescription medication and will use over the counter Advil, Alleve, Voltaren Gel or Tylenol as directed. (2) There is a moderate risk of morbidity with further treatment, especially from use of prescription strength medications and possible side effects of these medications which include upset stomach with oral medications, skin reactions to topical medications and cardiac/renal issues with long term use. (3) I recommended that the patient follow-up with their medical physician to discuss any significant specific potential issues with long term medication use such as interactions with current medications or with exacerbation of underlying medical comorbidities. (4) The benefits and risks associated with use of injectable, oral or topical, prescription and over the counter anti-inflammatory medications were discussed with the patient. The patient voiced understanding of the risks including but not limited to bleeding, stroke, kidney dysfunction, heart disease, and were referred to the black box warning label for further information.   IAnitra attest that this documentation has been prepared under the direction and in the presence of Provider Dr. Eric Shea.   The documentation recorded by the scribe accurately reflects the services Dr. Eric DE LA ROSA, personally performed and the decisions made by me. [FreeTextEntry3] : X-Ray left clavicle reveals evidence of bony remodeling and advanced callous formation about midshaft clavicle fracture.

## 2025-03-18 NOTE — HISTORY OF PRESENT ILLNESS
[de-identified] : The patient is a 14 year old RT hand dominant female who presents today for lt clavicle pain. Date of Injury/Onset: 02/01/25 Pain: At Rest: 0/10 With Activity: 3/10 - if putting full body weight on that arm  Mechanism of injury: pt states she was playing soccer and fell on her shoulder. pt states she heard a crack.  This is not a Work Related Injury being treated under Worker's Compensation. This is an athletic injury occurring associated with an interscholastic or organized sports team. Quality of symptoms: sharp pain  Improves with: tylenol and motrin  Worse with: [place] Motions Treatment/Imaging/Studies Since Last Visit: sling 	Reports Available For Review Today: none Out of work/sport: soccer, since 02/01/25 School/Sport/Position/Occupation: student Changes since last visit: Doing well, been doing stationary bike and elliptical without issue  Additional Information:

## 2025-03-18 NOTE — HISTORY OF PRESENT ILLNESS
[de-identified] : The patient is a 14 year old RT hand dominant female who presents today for lt clavicle pain. Date of Injury/Onset: 02/01/25 Pain: At Rest: 0/10 With Activity: 3/10 - if putting full body weight on that arm  Mechanism of injury: pt states she was playing soccer and fell on her shoulder. pt states she heard a crack.  This is not a Work Related Injury being treated under Worker's Compensation. This is an athletic injury occurring associated with an interscholastic or organized sports team. Quality of symptoms: sharp pain  Improves with: tylenol and motrin  Worse with: [place] Motions Treatment/Imaging/Studies Since Last Visit: sling 	Reports Available For Review Today: none Out of work/sport: soccer, since 02/01/25 School/Sport/Position/Occupation: student Changes since last visit: Doing well, been doing stationary bike and elliptical without issue  Additional Information:

## 2025-05-12 NOTE — IMAGING
[Right] : right foot [Weight -] : weightbearing [de-identified] : The patient is a well appearing 15 year  old female of their stated age. Patient ambulates with a normal gait. Negative straight leg raise.   Effected Foot: RIGHT   Inspection: Erythema: None Ecchymosis: None Abrasions: None Effusion: None Deformity: None Pes [place] Valgus: Negative Pes Cavus: Negative   Palpation: Crepitus: None Medial Malleolus: Nontender Lateral Malleolus: Nontender Syndesmosis: Nontender Proximal Fibula: Nontender ATFL: Nontender CFL: Nontender Deltoid: Nontender Calcaneus: Nontender [place]ar Head/Neck: Nontender Peroneal Tendons: Nontender Posterior Tibialis: Nontender Achilles Tendon: Nontender & Intact Anterior Capsule: Nontender Hindfoot: Nontender Midfoot: Nontender Forefoot: Nontender IP Joint: TENDER, 1st   ROM: Ankle Dorsiflexion: 30 degrees Ankle Plantar Flexion: 45 degrees Motor: Dorsiflexion: 5 out of 5 Plantar Flexion: 5 out of 5 Inversion: 5  out of 5 Eversion: 5 out of 5 EHL: 5 out of 5 FHL: 5 out of 5   Provocative Testing: Anterior Drawer: Negative Syndesmosis Squeeze Test: Negative Elkins's Test: Negative Midfoot Stability/Rotation: Negative Heel Raise Inversion: Normal Triple Hop: Normal Neurologic Exam: L4-S1 Sensation: Grossly Intact Vascular Exam/Pulses: Dorsalis Pedis: 2+ Posterior Tibialis: 2+ Capillary Refill: <2 Seconds Other Exams: None   Pertinent Contralateral Findings: None   Assessment: The patient is a 15-year-old female with right foot pain and radiographic and physical exam findings consistent with first IP joint sprain. The patients condition is acute Documents/Results Reviewed Today: X-Ray right foot  Tests/Studies Independently Interpreted Today: X-Ray right foot is benign, no obvious bony abnormalities.  Pertinent findings include: tender first IP joint, intact motion & strength.  Confounding medical conditions/concerns: None   Plan: Advised the patient that her clinical exam and mechanism of injury is consistent with a first IP joint sprain; we are not concerned for any fractures. The patient was advised for pain to be her guide back to gym and sports. She has no activity restrictions however, if discomfort is worsening she will shut down. Recommended taping for return to gym & sports. She may obtain a carbon fiber shoe insert. Discussed appropriate use of OTC anti-inflammatories as needed for pain, inflammation, and discomfort - use as directed and take with food. Modify activity as discussed.  Tests Ordered: None Prescription Medications Ordered: Discussed appropriate use of OTC anti-inflammatories and analgesics (including but not limited to Aleve, Advil, Tylenol, Motrin, Ibuprofen, Voltaren gel, etc.) Braces/DME Ordered: Carbon fiber insert  Activity/Work/Sports Status: As tolerated  Additional Instructions: Taping  Follow-Up: PRN  The patient's current medication management of their orthopedic diagnosis was reviewed today: The patient declined and/or was contraindicated for the recommended prescription medication Naprosyn and will use over the counter Advil, Aleve, Voltaren Gel or Tylenol as directed. (1) We discussed a comprehensive treatment plan that included possible pharmaceutical management involving the use of prescription strength medications including but not limited to options such as oral Naprosyn 500mg BID, once daily Meloxicam 15 mg, or 500-650 mg Tylenol versus over the counter oral medications and topical prescription NSAID Pennsaid vs over the counter Voltaren gel.  Based on our extensive discussion, the patient declined prescription medication and will use over the counter Advil, Alleve, Voltaren Gel or Tylenol as directed. (2) There is a moderate risk of morbidity with further treatment, especially from use of prescription strength medications and possible side effects of these medications which include upset stomach with oral medications, skin reactions to topical medications and cardiac/renal issues with long term use. (3) I recommended that the patient follow-up with their medical physician to discuss any significant specific potential issues with long term medication use such as interactions with current medications or with exacerbation of underlying medical comorbidities. (4) The benefits and risks associated with use of injectable, oral or topical, prescription and over the counter anti-inflammatory medications were discussed with the patient. The patient voiced understanding of the risks including but not limited to bleeding, stroke, kidney dysfunction, heart disease, and were referred to the black box warning label for further information.   IAnitra attest that this documentation has been prepared under the direction and in the presence of Provider Dr. Eric Shea.   The documentation recorded by the scribe accurately reflects the services IDr. Eric, personally performed and the decisions made by me.   [de-identified] : X-Ray right foot is benign, no obvious bony abnormalities.

## 2025-05-12 NOTE — HISTORY OF PRESENT ILLNESS
[de-identified] : The patient is a 15 year  old right hand dominant (left foot dominant) female who presents today complaining of right 1st toe pain.  Date of Injury/Onset: 5/4/25 Pain:    At Rest: 1/10  With Activity:  6-7/10  Mechanism of injury: Stubbed toe while plating soccer  This is NOT a Work Related Injury being treated under Worker's Compensation. This is NOT an athletic injury occurring associated with an interscholastic or organized sports team. Quality of symptoms: 1st toe pain, hot with activity  Improves with: rest Worse with: pushing off  Prior treatment: riya Sparks 3 days  Prior Imaging: OC XR 5/4/25 Out of work/sport: out of sports  School/Sport/Position/Occupation: [employer] soccer left back Additional Information: Dr. Shabbir CASTANO clavicle fx 2/2025, L great toe fx Dr. Caban 10/2024